# Patient Record
Sex: MALE | Race: WHITE | NOT HISPANIC OR LATINO | Employment: OTHER | URBAN - METROPOLITAN AREA
[De-identification: names, ages, dates, MRNs, and addresses within clinical notes are randomized per-mention and may not be internally consistent; named-entity substitution may affect disease eponyms.]

---

## 2018-12-13 ENCOUNTER — HOSPITAL ENCOUNTER (EMERGENCY)
Facility: MEDICAL CENTER | Age: 68
End: 2018-12-13
Attending: EMERGENCY MEDICINE

## 2018-12-13 VITALS
DIASTOLIC BLOOD PRESSURE: 93 MMHG | OXYGEN SATURATION: 96 % | RESPIRATION RATE: 18 BRPM | SYSTOLIC BLOOD PRESSURE: 135 MMHG | HEIGHT: 74 IN | TEMPERATURE: 97.5 F | BODY MASS INDEX: 18.76 KG/M2 | HEART RATE: 72 BPM | WEIGHT: 146.16 LBS

## 2018-12-13 DIAGNOSIS — R33.9 URINARY RETENTION: ICD-10-CM

## 2018-12-13 LAB
APPEARANCE UR: CLEAR
BILIRUB UR QL STRIP.AUTO: NEGATIVE
COLOR UR: YELLOW
GLUCOSE UR STRIP.AUTO-MCNC: NEGATIVE MG/DL
KETONES UR STRIP.AUTO-MCNC: NEGATIVE MG/DL
LEUKOCYTE ESTERASE UR QL STRIP.AUTO: NEGATIVE
MICRO URNS: NORMAL
NITRITE UR QL STRIP.AUTO: NEGATIVE
PH UR STRIP.AUTO: 5 [PH]
PROT UR QL STRIP: NEGATIVE MG/DL
RBC UR QL AUTO: NEGATIVE
SP GR UR STRIP.AUTO: 1.01
UROBILINOGEN UR STRIP.AUTO-MCNC: 0.2 MG/DL

## 2018-12-13 PROCEDURE — 99284 EMERGENCY DEPT VISIT MOD MDM: CPT

## 2018-12-13 PROCEDURE — 81003 URINALYSIS AUTO W/O SCOPE: CPT

## 2018-12-13 PROCEDURE — 51701 INSERT BLADDER CATHETER: CPT

## 2018-12-13 ASSESSMENT — PAIN SCALES - GENERAL: PAINLEVEL_OUTOF10: 10

## 2018-12-13 NOTE — ED TRIAGE NOTES
Mame Lester  68 y.o.  male  Chief Complaint   Patient presents with   • Unable to Urinate     Present to triage c/o unable to urinate x 2-3 hrs. Prefer to stand due to pain.

## 2018-12-13 NOTE — ED PROVIDER NOTES
"ER Provider Note     Scribed for Valentín Lyle M.D. by Luba Goins. 12/13/2018, 3:02 AM.    Primary Care Provider: None noted  Means of Arrival: Walk in   History obtained from: Patient  History limited by: None     CHIEF COMPLAINT  Chief Complaint   Patient presents with   • Unable to Urinate       HPI  Mame Lester is a 68 y.o. male with history of enlarged prostate who presents to the Emergency Department for evaluation of urinary retention with associated lower abdominal pain. He states he was last able to urinate normally at 1 AM tonight. The patient reports prior to urinary retention he consumed wine [place] a local casino, and states he has had prior episodes of urinary retention following alcohol consumption. He denies associated dysuria. The patient also reports an approximate 35lb weight loss over the past 6 months with no change in diet or purposeful weight loss. No alleviating or exacerbating factors are identified at this time.     REVIEW OF SYSTEMS  See HPI for further details.    PAST MEDICAL HISTORY   Urinary Retention  Enlarged Prostate    SURGICAL HISTORY  patient denies any surgical history    SOCIAL HISTORY  Social History   Substance Use Topics   • Smoking status: Never Smoker   • Smokeless tobacco: Never Used   • Alcohol use Yes      Comment: occ      History   Drug Use No       FAMILY HISTORY  History reviewed. No pertinent family history.    CURRENT MEDICATIONS  Home Medications     Reviewed by Otis Zhu R.N. (Registered Nurse) on 12/13/18 at 0240  Med List Status: Complete   Medication Last Dose Status        Patient Jovanni Taking any Medications                       ALLERGIES  No Known Allergies    PHYSICAL EXAM  VITAL SIGNS: /93   Pulse 86   Temp 36.4 °C (97.5 °F) (Temporal)   Resp 14   Ht 1.88 m (6' 2\")   Wt 66.3 kg (146 lb 2.6 oz)   SpO2 97%   BMI 18.77 kg/m²       Constitutional: Alert in no apparent distress.  HENT: Normocephalic, Atraumatic, Bilateral external " ears normal. Nose normal.   Eyes: Pupils are equal and reactive. Conjunctiva normal, non-icteric.   Heart: Regular rate and rythm, no murmurs.    Lungs: Clear to auscultation bilaterally.  Abdomen: Suprapubic tenderness and fullness.  Skin: Warm, Dry, No erythema, No rash.   Neurologic: Alert, Grossly non-focal.   Psychiatric: Affect normal, Judgment normal, Mood normal, Appears appropriate and not intoxicated.     DIAGNOSTIC STUDIES / PROCEDURES    LABS  Labs Reviewed   URINALYSIS,CULTURE IF INDICATED     All labs reviewed by me.    COURSE & MEDICAL DECISION MAKING  Pertinent Labs & Imaging studies reviewed. (See chart for details)    This is a 68 y.o. male that presents with inability to urinate.  The patient has a history of this.  The patient is unsure that he has any prostate issues.  At this time I will get a urinalysis and the patient is requesting that I did not place a Spencer and wants just a straight cath.     3:02 AM - Patient seen and examined at bedside. Ordered Urinalysis. Bedside ultrasound shows distended bladder at this time. The patient was informed he will require a catheter, but does not wish to have spencer catheter placed. He is understanding of risks associated like being unable void bladder if he does not have spencer catheter placed. He will be straight cathed to retrieve urine sample and consents.     3:47 AM - Patient reevaluated at bedside. He reports to be feeling better after straight catheter. Discussed with the patient the possibility of cancer given recent weight loss and acuity of symptoms. I stressed the importance of follow up with Urology. He is understanding and agreeable to discharge.     The patient was found to have no infection in his urine.  The patient was able to void after I straight cathed him.     The patient will return for new or worsening symptoms and is stable at the time of discharge.    The patient is referred to a primary physician for blood pressure management,  diabetic screening, and for all other preventative health concerns.    DISPOSITION:  Patient will be discharged home in stable condition.    FOLLOW UP:  Blake Flores M.D.  [address]  [place] [id number]  [phone]    In 1 week        OUTPATIENT MEDICATIONS:  There are no discharge medications for this patient.      FINAL IMPRESSION  1. Urinary retention          Luba HUNT (Scribellie), am scribing for, and in the presence of, Valentín Lyle M.D..    Electronically signed by: Luba Goins (Daniela), 12/13/2018    Valentín HUNT M.D. personally performed the services described in this documentation, as scribed by Luba Goins in my presence, and it is both accurate and complete.     E.    The note accurately reflects work and decisions made by me.  Valentín Lyle  12/13/2018  6:00 AM

## 2018-12-13 NOTE — PROGRESS NOTES
Discharging patient home. Verbalized understanding of discharge instructions, follow up appointments, and home care. All questions answered and all personal belongings with patient at time of discharge. Vital signs WNL. Patient given discharge information papers and discharge assessment completed.     Pt ambulated to lobby with steady gait.

## 2018-12-13 NOTE — ED NOTES
Pt straight cathed for urine retention, significant resistance met during cath. No trauma to area  600mL out sent to lab   Pt states he was 170lbs 6 months ago, states no purposeful weight loss, no changes in diet. Pt is now 135 per pt.   ERP notified.

## 2019-01-15 ENCOUNTER — HOSPITAL ENCOUNTER (EMERGENCY)
Dept: HOSPITAL 8 - ED | Age: 69
Discharge: HOME | End: 2019-01-15
Payer: SELF-PAY

## 2019-01-15 VITALS — SYSTOLIC BLOOD PRESSURE: 104 MMHG | DIASTOLIC BLOOD PRESSURE: 63 MMHG

## 2019-01-15 VITALS — BODY MASS INDEX: 18.53 KG/M2 | HEIGHT: 74 IN | WEIGHT: 144.4 LBS

## 2019-01-15 DIAGNOSIS — R33.8: ICD-10-CM

## 2019-01-15 DIAGNOSIS — N40.1: Primary | ICD-10-CM

## 2019-01-15 LAB
ALBUMIN SERPL-MCNC: 3.8 G/DL (ref 3.4–5)
ANION GAP SERPL CALC-SCNC: 5 MMOL/L (ref 5–15)
BASOPHILS # BLD AUTO: 0.03 X10^3/UL (ref 0–0.1)
BASOPHILS NFR BLD AUTO: 1 % (ref 0–1)
CALCIUM SERPL-MCNC: 8.7 MG/DL (ref 8.5–10.1)
CHLORIDE SERPL-SCNC: 107 MMOL/L (ref 98–107)
CREAT SERPL-MCNC: 0.88 MG/DL (ref 0.7–1.3)
CULTURE INDICATED?: NO
EOSINOPHIL # BLD AUTO: 0.41 X10^3/UL (ref 0–0.4)
EOSINOPHIL NFR BLD AUTO: 7 % (ref 1–7)
ERYTHROCYTE [DISTWIDTH] IN BLOOD BY AUTOMATED COUNT: 12.8 % (ref 9.4–14.8)
LYMPHOCYTES # BLD AUTO: 1.39 X10^3/UL (ref 1–3.4)
LYMPHOCYTES NFR BLD AUTO: 23 % (ref 22–44)
MCH RBC QN AUTO: 32.6 PG (ref 27.5–34.5)
MCHC RBC AUTO-ENTMCNC: 34.2 G/DL (ref 33.2–36.2)
MCV RBC AUTO: 95.3 FL (ref 81–97)
MD: NO
MICROSCOPIC: (no result)
MONOCYTES # BLD AUTO: 0.54 X10^3/UL (ref 0.2–0.8)
MONOCYTES NFR BLD AUTO: 9 % (ref 2–9)
NEUTROPHILS # BLD AUTO: 3.57 X10^3/UL (ref 1.8–6.8)
NEUTROPHILS NFR BLD AUTO: 60 % (ref 42–75)
PLATELET # BLD AUTO: 243 X10^3/UL (ref 130–400)
PMV BLD AUTO: 8.6 FL (ref 7.4–10.4)
RBC # BLD AUTO: 4.62 X10^6/UL (ref 4.38–5.82)

## 2019-01-15 PROCEDURE — 82040 ASSAY OF SERUM ALBUMIN: CPT

## 2019-01-15 PROCEDURE — 99283 EMERGENCY DEPT VISIT LOW MDM: CPT

## 2019-01-15 PROCEDURE — 81001 URINALYSIS AUTO W/SCOPE: CPT

## 2019-01-15 PROCEDURE — 85025 COMPLETE CBC W/AUTO DIFF WBC: CPT

## 2019-01-15 PROCEDURE — 80048 BASIC METABOLIC PNL TOTAL CA: CPT

## 2019-01-15 PROCEDURE — 36415 COLL VENOUS BLD VENIPUNCTURE: CPT

## 2019-01-15 NOTE — NUR
Pt ambulates to restroom with steady gait and balane. Pt ambulates back to 
room. Pt able to urinate on his own at this time.

## 2019-01-15 NOTE — NUR
PT TO ROOM WITH INTENSE FEELING THAT HE IS RETAINING URINE, PT REQUSETED IN AND 
OUT CATH, CATHED  ML

## 2019-01-15 NOTE — NUR
Patient given discharge instructions and they have confirmed that they 
understand the instructions.  Patient ambulatory with steady gait. Pt left with 
all personal belongings, discharge paperwork, and prescription.

## 2019-01-15 NOTE — NUR
Recieved report from BALBINA Nicolas. All questions answered. Assuming care of this 
pt at this time. First contact with pt. Pt laying on gurney connected to NIBP 
and continous pulse ox. NADN. Pt denies pain at this time. Pt states, "I had 
urinary retention about two months ago, it seems to happen when I drink. I am 
feeling better now since she straight cathed me." Pt has call light within 
reach. All safety measures in place. No needs expressed at this time.

## 2019-02-14 ENCOUNTER — HOSPITAL ENCOUNTER (EMERGENCY)
Dept: HOSPITAL 8 - ED | Age: 69
Discharge: LEFT BEFORE DISCHARGE | End: 2019-02-14
Payer: COMMERCIAL

## 2019-02-14 VITALS — HEIGHT: 73 IN | WEIGHT: 138.89 LBS | BODY MASS INDEX: 18.41 KG/M2

## 2019-02-14 VITALS — DIASTOLIC BLOOD PRESSURE: 93 MMHG | SYSTOLIC BLOOD PRESSURE: 138 MMHG

## 2019-02-14 DIAGNOSIS — N40.1: Primary | ICD-10-CM

## 2019-02-14 DIAGNOSIS — R33.8: ICD-10-CM

## 2019-02-14 PROCEDURE — 51701 INSERT BLADDER CATHETER: CPT

## 2019-02-14 PROCEDURE — 99284 EMERGENCY DEPT VISIT MOD MDM: CPT

## 2019-02-14 PROCEDURE — 99283 EMERGENCY DEPT VISIT LOW MDM: CPT

## 2019-02-14 PROCEDURE — 99281 EMR DPT VST MAYX REQ PHY/QHP: CPT

## 2019-02-14 PROCEDURE — P9612 CATHETERIZE FOR URINE SPEC: HCPCS

## 2019-02-14 NOTE — NUR
PT STATES HE WOULD ONLY LIKE TO GET A STRAIGHT CATH AND NO FURTHER TESTING. 
EDUCATED BY PROVIDER AND THIS RN. PT STILL REFUSING. PT SIGNED AMA AND PLACED 
ON CHART.

## 2019-04-16 ENCOUNTER — HOSPITAL ENCOUNTER (EMERGENCY)
Facility: MEDICAL CENTER | Age: 69
End: 2019-04-16
Attending: EMERGENCY MEDICINE

## 2019-04-16 ENCOUNTER — PATIENT OUTREACH (OUTPATIENT)
Dept: HEALTH INFORMATION MANAGEMENT | Facility: OTHER | Age: 69
End: 2019-04-16

## 2019-04-16 VITALS
HEIGHT: 74 IN | OXYGEN SATURATION: 97 % | SYSTOLIC BLOOD PRESSURE: 114 MMHG | WEIGHT: 140.43 LBS | HEART RATE: 100 BPM | TEMPERATURE: 98.1 F | DIASTOLIC BLOOD PRESSURE: 75 MMHG | BODY MASS INDEX: 18.02 KG/M2 | RESPIRATION RATE: 16 BRPM

## 2019-04-16 DIAGNOSIS — R33.9 URINARY RETENTION: ICD-10-CM

## 2019-04-16 LAB
APPEARANCE UR: CLEAR
BACTERIA #/AREA URNS HPF: NEGATIVE /HPF
BILIRUB UR QL STRIP.AUTO: NEGATIVE
COLOR UR: YELLOW
EPI CELLS #/AREA URNS HPF: NEGATIVE /HPF
GLUCOSE UR STRIP.AUTO-MCNC: NEGATIVE MG/DL
HYALINE CASTS #/AREA URNS LPF: ABNORMAL /LPF
KETONES UR STRIP.AUTO-MCNC: NEGATIVE MG/DL
LEUKOCYTE ESTERASE UR QL STRIP.AUTO: NEGATIVE
MICRO URNS: ABNORMAL
NITRITE UR QL STRIP.AUTO: NEGATIVE
PH UR STRIP.AUTO: 5 [PH]
PROT UR QL STRIP: NEGATIVE MG/DL
RBC # URNS HPF: ABNORMAL /HPF
RBC UR QL AUTO: ABNORMAL
SP GR UR STRIP.AUTO: 1.01
UROBILINOGEN UR STRIP.AUTO-MCNC: 0.2 MG/DL
WBC #/AREA URNS HPF: ABNORMAL /HPF

## 2019-04-16 PROCEDURE — 99284 EMERGENCY DEPT VISIT MOD MDM: CPT

## 2019-04-16 PROCEDURE — 51702 INSERT TEMP BLADDER CATH: CPT

## 2019-04-16 PROCEDURE — 81001 URINALYSIS AUTO W/SCOPE: CPT

## 2019-04-16 PROCEDURE — 303105 HCHG CATHETER EXTRA

## 2019-04-16 NOTE — ED NOTES
I go to pt room to provide discharge instructions and pt has left.  Unable to provide instructions  Unable to take DC Vital signs.

## 2019-04-16 NOTE — ED NOTES
#16 fr spencer catheter placed without difficulty.  Immediate return of 700 ml of clear yellow urine.

## 2019-04-16 NOTE — ED NOTES
"To yellow 62.  Hasn't voided since early am.  States \"I can't drink the casino wine, every time I do this happens\"  Does not happen with other jason.    "

## 2019-04-16 NOTE — ED TRIAGE NOTES
Pt comes in complaining of urinary retention since early this morning. Pt stating hx of the same and he needed a catheter. Pt appears uncomfortable.

## 2019-07-14 ENCOUNTER — HOSPITAL ENCOUNTER (EMERGENCY)
Dept: HOSPITAL 8 - ED | Age: 69
Discharge: HOME | End: 2019-07-14
Payer: SELF-PAY

## 2019-07-14 VITALS — BODY MASS INDEX: 18.9 KG/M2 | HEIGHT: 74 IN | WEIGHT: 147.27 LBS

## 2019-07-14 VITALS — DIASTOLIC BLOOD PRESSURE: 67 MMHG | SYSTOLIC BLOOD PRESSURE: 105 MMHG

## 2019-07-14 DIAGNOSIS — R33.8: ICD-10-CM

## 2019-07-14 DIAGNOSIS — N40.1: Primary | ICD-10-CM

## 2019-07-14 LAB
CULTURE INDICATED?: NO
MICROSCOPIC: (no result)

## 2019-07-14 PROCEDURE — 81001 URINALYSIS AUTO W/SCOPE: CPT

## 2019-07-14 PROCEDURE — 99284 EMERGENCY DEPT VISIT MOD MDM: CPT

## 2019-07-14 NOTE — NUR
PT RESTING IN BED. NO NEEDS EXPRESSED. CALL LIGHT WITHIN REACH. PT REPORTS 
RELIEF AND LESS BLADDER PAIN. AWAITING FURTHER ORDERS.

## 2019-07-14 NOTE — NUR
straight cath completed [facility] sterile technique per pt request. approx 850mL 
drained from bladder. ua collected and sent. vss. pt tolerated well. awaiting 
resutls.

## 2019-07-14 NOTE — NUR
PT TO ED FOR URINARY RETENTION STARTING LAST NIGHT AFTER DRINKING ALCOHOL. PT 
CONNECTED TO MONITORS. VSS. EDMD PRESENT FOR ASSESSMENT WITH US. PLAN TO 
CATHETERIZE AND ADMIN FLOMAX. WILL SEND UA AFTER CATHETER. AWAITING ORDERS.

## 2020-02-27 ENCOUNTER — HOSPITAL ENCOUNTER (EMERGENCY)
Dept: HOSPITAL 8 - ED | Age: 70
Discharge: HOME | End: 2020-02-27
Payer: SELF-PAY

## 2020-02-27 VITALS — HEIGHT: 74 IN | BODY MASS INDEX: 18.87 KG/M2 | WEIGHT: 147.05 LBS

## 2020-02-27 VITALS — SYSTOLIC BLOOD PRESSURE: 125 MMHG | DIASTOLIC BLOOD PRESSURE: 81 MMHG

## 2020-02-27 DIAGNOSIS — R33.8: ICD-10-CM

## 2020-02-27 DIAGNOSIS — N40.1: Primary | ICD-10-CM

## 2020-02-27 LAB
ALBUMIN SERPL-MCNC: 3.5 G/DL (ref 3.4–5)
ANION GAP SERPL CALC-SCNC: 10 MMOL/L (ref 5–15)
BASOPHILS # BLD AUTO: 0.04 X10^3/UL (ref 0–0.1)
BASOPHILS NFR BLD AUTO: 1 % (ref 0–1)
CALCIUM SERPL-MCNC: 8.1 MG/DL (ref 8.5–10.1)
CHLORIDE SERPL-SCNC: 105 MMOL/L (ref 98–107)
CREAT SERPL-MCNC: 0.89 MG/DL (ref 0.7–1.3)
CULTURE INDICATED?: NO
EOSINOPHIL # BLD AUTO: 0.03 X10^3/UL (ref 0–0.4)
EOSINOPHIL NFR BLD AUTO: 0 % (ref 1–7)
ERYTHROCYTE [DISTWIDTH] IN BLOOD BY AUTOMATED COUNT: 15 % (ref 9.4–14.8)
LYMPHOCYTES # BLD AUTO: 0.79 X10^3/UL (ref 1–3.4)
LYMPHOCYTES NFR BLD AUTO: 11 % (ref 22–44)
MCH RBC QN AUTO: 31.1 PG (ref 27.5–34.5)
MCHC RBC AUTO-ENTMCNC: 33.5 G/DL (ref 33.2–36.2)
MCV RBC AUTO: 92.7 FL (ref 81–97)
MD: NO
MICROSCOPIC: (no result)
MONOCYTES # BLD AUTO: 0.28 X10^3/UL (ref 0.2–0.8)
MONOCYTES NFR BLD AUTO: 4 % (ref 2–9)
NEUTROPHILS # BLD AUTO: 5.82 X10^3/UL (ref 1.8–6.8)
NEUTROPHILS NFR BLD AUTO: 84 % (ref 42–75)
PLATELET # BLD AUTO: 291 X10^3/UL (ref 130–400)
PMV BLD AUTO: 8.6 FL (ref 7.4–10.4)
RBC # BLD AUTO: 4.69 X10^6/UL (ref 4.38–5.82)

## 2020-02-27 PROCEDURE — 82040 ASSAY OF SERUM ALBUMIN: CPT

## 2020-02-27 PROCEDURE — 99283 EMERGENCY DEPT VISIT LOW MDM: CPT

## 2020-02-27 PROCEDURE — 81003 URINALYSIS AUTO W/O SCOPE: CPT

## 2020-02-27 PROCEDURE — 80048 BASIC METABOLIC PNL TOTAL CA: CPT

## 2020-02-27 PROCEDURE — 85025 COMPLETE CBC W/AUTO DIFF WBC: CPT

## 2020-02-27 PROCEDURE — 36415 COLL VENOUS BLD VENIPUNCTURE: CPT

## 2020-02-27 NOTE — NUR
INSERTED HINOJOSA CATHETER, STERILE TECHNIQUE OBSERVED BY SECONDARY RN. 



ERMD IN TO EVAL PT, PT DOES NOT WISH AT THIS TIME TO KEEP HINOJOSA CATHETER 
DESPITE URINARY RETENTION. WILL RE-EVAL

## 2020-02-27 NOTE — NUR
MULTIPLE ATTEMPTS TO EDUCATE PT ON IMPORTANCE OF KEEPING HINOJOSA ON DC D/T 
RETENTION, PT INSISTS ON HAVING IT REMOVED, PT STATES "I DONT HAVE FUNDS" PT 
BELIEVES HE RETAINS URINE ONLY WHEN DRINKING CASINO BEER, PT STATES HE WILL NOT 
DO THIS ANYMORE.

## 2020-06-28 ENCOUNTER — HOSPITAL ENCOUNTER (EMERGENCY)
Dept: HOSPITAL 8 - ED | Age: 70
LOS: 1 days | Discharge: HOME | End: 2020-06-29
Payer: COMMERCIAL

## 2020-06-28 VITALS — WEIGHT: 150.36 LBS | HEIGHT: 74 IN | BODY MASS INDEX: 19.3 KG/M2

## 2020-06-28 VITALS — SYSTOLIC BLOOD PRESSURE: 104 MMHG | DIASTOLIC BLOOD PRESSURE: 66 MMHG

## 2020-06-28 DIAGNOSIS — R33.8: Primary | ICD-10-CM

## 2020-06-28 LAB — MICROSCOPIC: (no result)

## 2020-06-28 PROCEDURE — 99284 EMERGENCY DEPT VISIT MOD MDM: CPT

## 2020-06-28 PROCEDURE — 81001 URINALYSIS AUTO W/SCOPE: CPT

## 2020-06-28 PROCEDURE — 51702 INSERT TEMP BLADDER CATH: CPT

## 2020-06-29 NOTE — NUR
HINOJOSA REMOVED PER PT REQUEST, PT REFUSES TO KEEP HINOJOSA OR FOLLOW UP REFERRAL, 
PT STATES HE WILL TAKE CARE OF HIMSELFT AT HOME AND NOT DRINK ALCOHOL AT THE 
[place], PT REPORTS HE BELIEVES DRINKING ALCOHOL AT THE [place] CAUSED HIS 
URINARY RETENTION.

## 2021-03-03 DIAGNOSIS — Z23 NEED FOR VACCINATION: ICD-10-CM

## 2021-03-18 ENCOUNTER — HOSPITAL ENCOUNTER (EMERGENCY)
Dept: HOSPITAL 8 - ED | Age: 71
Discharge: HOME | End: 2021-03-18
Payer: SELF-PAY

## 2021-03-18 VITALS — HEIGHT: 74 IN | WEIGHT: 148.37 LBS | BODY MASS INDEX: 19.04 KG/M2

## 2021-03-18 VITALS — DIASTOLIC BLOOD PRESSURE: 50 MMHG | SYSTOLIC BLOOD PRESSURE: 99 MMHG

## 2021-03-18 DIAGNOSIS — R31.0: ICD-10-CM

## 2021-03-18 DIAGNOSIS — R33.8: ICD-10-CM

## 2021-03-18 DIAGNOSIS — N40.1: Primary | ICD-10-CM

## 2021-03-18 LAB
ALBUMIN SERPL-MCNC: 3.6 G/DL (ref 3.4–5)
ALP SERPL-CCNC: 79 U/L (ref 45–117)
ALT SERPL-CCNC: 15 U/L (ref 12–78)
ANION GAP SERPL CALC-SCNC: 9 MMOL/L (ref 5–15)
BASOPHILS # BLD AUTO: 0.1 X10^3/UL (ref 0–0.1)
BASOPHILS NFR BLD AUTO: 1 % (ref 0–1)
BILIRUB SERPL-MCNC: 0.6 MG/DL (ref 0.2–1)
CALCIUM SERPL-MCNC: 8.4 MG/DL (ref 8.5–10.1)
CHLORIDE SERPL-SCNC: 104 MMOL/L (ref 98–107)
CREAT SERPL-MCNC: 1.1 MG/DL (ref 0.7–1.3)
EOSINOPHIL # BLD AUTO: 0 X10^3/UL (ref 0–0.4)
EOSINOPHIL NFR BLD AUTO: 0 % (ref 1–7)
ERYTHROCYTE [DISTWIDTH] IN BLOOD BY AUTOMATED COUNT: 13.2 % (ref 9.4–14.8)
LYMPHOCYTES # BLD AUTO: 0.9 X10^3/UL (ref 1–3.4)
LYMPHOCYTES NFR BLD AUTO: 10 % (ref 22–44)
MCH RBC QN AUTO: 32.9 PG (ref 27.5–34.5)
MCHC RBC AUTO-ENTMCNC: 34.5 G/DL (ref 33.2–36.2)
MD: NO
MICROSCOPIC: (no result)
MONOCYTES # BLD AUTO: 0.5 X10^3/UL (ref 0.2–0.8)
MONOCYTES NFR BLD AUTO: 6 % (ref 2–9)
NEUTROPHILS # BLD AUTO: 7.2 X10^3/UL (ref 1.8–6.8)
NEUTROPHILS NFR BLD AUTO: 83 % (ref 42–75)
PLATELET # BLD AUTO: 262 X10^3/UL (ref 130–400)
PMV BLD AUTO: 9.1 FL (ref 7.4–10.4)
PROT SERPL-MCNC: 6.9 G/DL (ref 6.4–8.2)
RBC # BLD AUTO: 4.47 X10^6/UL (ref 4.38–5.82)

## 2021-03-18 PROCEDURE — 80053 COMPREHEN METABOLIC PANEL: CPT

## 2021-03-18 PROCEDURE — 51702 INSERT TEMP BLADDER CATH: CPT

## 2021-03-18 PROCEDURE — 81001 URINALYSIS AUTO W/SCOPE: CPT

## 2021-03-18 PROCEDURE — 36415 COLL VENOUS BLD VENIPUNCTURE: CPT

## 2021-03-18 PROCEDURE — 85025 COMPLETE CBC W/AUTO DIFF WBC: CPT

## 2021-03-18 PROCEDURE — 99284 EMERGENCY DEPT VISIT MOD MDM: CPT

## 2021-03-18 NOTE — NUR
BLADDER SCAN . PER PROVIDER STRAIGHT CATH TO DRAIN. PT TOLERATED 
PROCEDURE WELL.  UA WILL BE SENT TO RULE OUT UTI.

## 2021-03-18 NOTE — NUR
Unable to void since midnight (9.5 hours). Hx of same that resolved with 
straight cath and rx

bladder scan of 77. Patient adamant on not keeping garcia in place. erp 
agreeable

## 2021-06-22 ENCOUNTER — HOSPITAL ENCOUNTER (EMERGENCY)
Dept: HOSPITAL 8 - ED | Age: 71
Discharge: HOME | End: 2021-06-22
Payer: MEDICARE

## 2021-06-22 VITALS — BODY MASS INDEX: 19.1 KG/M2 | WEIGHT: 148.81 LBS | HEIGHT: 74 IN

## 2021-06-22 VITALS — SYSTOLIC BLOOD PRESSURE: 108 MMHG | DIASTOLIC BLOOD PRESSURE: 59 MMHG

## 2021-06-22 DIAGNOSIS — R33.8: ICD-10-CM

## 2021-06-22 DIAGNOSIS — N40.1: Primary | ICD-10-CM

## 2021-06-22 LAB — MICROSCOPIC: (no result)

## 2021-06-22 PROCEDURE — 99284 EMERGENCY DEPT VISIT MOD MDM: CPT

## 2021-06-22 PROCEDURE — 51702 INSERT TEMP BLADDER CATH: CPT

## 2021-06-22 PROCEDURE — 81001 URINALYSIS AUTO W/SCOPE: CPT

## 2021-06-22 NOTE — NUR
HINOJOSA REMOVED NO ISSUES. Patient/Caregiver given discharge instructions and 
they have confirmed that they understand the instructions.  Patient ambulatory 
with steady gait.

## 2021-06-22 NOTE — NUR
pt c/o of not being able to urinate since midnight last night.

states it always happens when he drinks. reports drinking 3 glasses of wine and 
2 beers.



pt attached to monitors. vss. nadn. breathing even and unlabored. bladder 
distention noted. 

pt is refusing a garcia catheter and requesting a straight catheter that stays 
out.

MD recommends pt to seeing a urologist but pt appears reluctant.



bed in low position, rails engaged, call light on lap.

wctm

## 2021-06-22 NOTE — NUR
INSERTED HINOJOSA CATHTER PER HOSPITAL POLICY. PT URINE CLEAR AND YELLOW. OUTPUT 
1050ML

A LOT OF RESISTANCE FROM POSSIBLE ENLARGED PROSTATE. PT REFUSING TO KEEP HINOJOSA 
IN AFTER DC. 

BEDSIDE REPORT GIVEN TO LOPEZ. TRANSFER OF CARE. UA SENT TO LAB

## 2022-10-17 ENCOUNTER — APPOINTMENT (OUTPATIENT)
Dept: RADIOLOGY | Facility: MEDICAL CENTER | Age: 72
DRG: 378 | End: 2022-10-17
Attending: EMERGENCY MEDICINE
Payer: MEDICARE

## 2022-10-17 ENCOUNTER — HOSPITAL ENCOUNTER (INPATIENT)
Facility: MEDICAL CENTER | Age: 72
LOS: 1 days | DRG: 378 | End: 2022-10-18
Attending: EMERGENCY MEDICINE | Admitting: STUDENT IN AN ORGANIZED HEALTH CARE EDUCATION/TRAINING PROGRAM
Payer: MEDICARE

## 2022-10-17 DIAGNOSIS — K92.2 GASTROINTESTINAL HEMORRHAGE, UNSPECIFIED GASTROINTESTINAL HEMORRHAGE TYPE: ICD-10-CM

## 2022-10-17 DIAGNOSIS — R07.9 ACUTE CHEST PAIN: ICD-10-CM

## 2022-10-17 DIAGNOSIS — F10.10 ETOH ABUSE: ICD-10-CM

## 2022-10-17 DIAGNOSIS — D62 ACUTE BLOOD LOSS ANEMIA: ICD-10-CM

## 2022-10-17 PROBLEM — E46 PROTEIN CALORIE MALNUTRITION (HCC): Status: ACTIVE | Noted: 2022-10-17

## 2022-10-17 LAB
ABO + RH BLD: NORMAL
ABO GROUP BLD: NORMAL
ALBUMIN SERPL BCP-MCNC: 3.7 G/DL (ref 3.2–4.9)
ALBUMIN/GLOB SERPL: 1.2 G/DL
ALP SERPL-CCNC: 80 U/L (ref 30–99)
ALT SERPL-CCNC: 10 U/L (ref 2–50)
ANION GAP SERPL CALC-SCNC: 13 MMOL/L (ref 7–16)
APTT PPP: 30.3 SEC (ref 24.7–36)
AST SERPL-CCNC: 20 U/L (ref 12–45)
BASOPHILS # BLD AUTO: 0.7 % (ref 0–1.8)
BASOPHILS # BLD: 0.05 K/UL (ref 0–0.12)
BILIRUB SERPL-MCNC: 0.3 MG/DL (ref 0.1–1.5)
BLD GP AB SCN SERPL QL: NORMAL
BUN SERPL-MCNC: 18 MG/DL (ref 8–22)
CALCIUM SERPL-MCNC: 8.8 MG/DL (ref 8.5–10.5)
CFT BLD TEG: 4.9 MIN (ref 4.6–9.1)
CFT P HPASE BLD TEG: 4.3 MIN (ref 4.3–8.3)
CHLORIDE SERPL-SCNC: 100 MMOL/L (ref 96–112)
CLOT ANGLE BLD TEG: 79.7 DEGREES (ref 63–78)
CLOT LYSIS 30M P MA LENFR BLD TEG: 0.4 % (ref 0–2.6)
CO2 SERPL-SCNC: 26 MMOL/L (ref 20–33)
CREAT SERPL-MCNC: 0.72 MG/DL (ref 0.5–1.4)
CT.EXTRINSIC BLD ROTEM: 0.8 MIN (ref 0.8–2.1)
EKG IMPRESSION: NORMAL
EOSINOPHIL # BLD AUTO: 0.04 K/UL (ref 0–0.51)
EOSINOPHIL NFR BLD: 0.6 % (ref 0–6.9)
ERYTHROCYTE [DISTWIDTH] IN BLOOD BY AUTOMATED COUNT: 45.1 FL (ref 35.9–50)
GFR SERPLBLD CREATININE-BSD FMLA CKD-EPI: 98 ML/MIN/1.73 M 2
GLOBULIN SER CALC-MCNC: 3.2 G/DL (ref 1.9–3.5)
GLUCOSE SERPL-MCNC: 88 MG/DL (ref 65–99)
HCT VFR BLD AUTO: 35.1 % (ref 42–52)
HGB BLD-MCNC: 10.7 G/DL (ref 14–18)
HGB BLD-MCNC: 11.6 G/DL (ref 14–18)
IMM GRANULOCYTES # BLD AUTO: 0.02 K/UL (ref 0–0.11)
IMM GRANULOCYTES NFR BLD AUTO: 0.3 % (ref 0–0.9)
INR PPP: 1.23 (ref 0.87–1.13)
LIPASE SERPL-CCNC: 27 U/L (ref 11–82)
LYMPHOCYTES # BLD AUTO: 1.43 K/UL (ref 1–4.8)
LYMPHOCYTES NFR BLD: 20.9 % (ref 22–41)
MCF BLD TEG: 68.8 MM (ref 52–69)
MCF.PLATELET INHIB BLD ROTEM: 36 MM (ref 15–32)
MCH RBC QN AUTO: 31 PG (ref 27–33)
MCHC RBC AUTO-ENTMCNC: 33 G/DL (ref 33.7–35.3)
MCV RBC AUTO: 93.9 FL (ref 81.4–97.8)
MONOCYTES # BLD AUTO: 0.54 K/UL (ref 0–0.85)
MONOCYTES NFR BLD AUTO: 7.9 % (ref 0–13.4)
NEUTROPHILS # BLD AUTO: 4.77 K/UL (ref 1.82–7.42)
NEUTROPHILS NFR BLD: 69.6 % (ref 44–72)
NRBC # BLD AUTO: 0 K/UL
NRBC BLD-RTO: 0 /100 WBC
PLATELET # BLD AUTO: 344 K/UL (ref 164–446)
PMV BLD AUTO: 9.6 FL (ref 9–12.9)
POTASSIUM SERPL-SCNC: 3.8 MMOL/L (ref 3.6–5.5)
PROT SERPL-MCNC: 6.9 G/DL (ref 6–8.2)
PROTHROMBIN TIME: 15.3 SEC (ref 12–14.6)
RBC # BLD AUTO: 3.74 M/UL (ref 4.7–6.1)
RH BLD: NORMAL
SODIUM SERPL-SCNC: 139 MMOL/L (ref 135–145)
TEG ALGORITHM TGALG: ABNORMAL
TROPONIN T SERPL-MCNC: 13 NG/L (ref 6–19)
WBC # BLD AUTO: 6.9 K/UL (ref 4.8–10.8)

## 2022-10-17 PROCEDURE — 700111 HCHG RX REV CODE 636 W/ 250 OVERRIDE (IP): Mod: JA | Performed by: STUDENT IN AN ORGANIZED HEALTH CARE EDUCATION/TRAINING PROGRAM

## 2022-10-17 PROCEDURE — 85576 BLOOD PLATELET AGGREGATION: CPT

## 2022-10-17 PROCEDURE — 80053 COMPREHEN METABOLIC PANEL: CPT

## 2022-10-17 PROCEDURE — 99285 EMERGENCY DEPT VISIT HI MDM: CPT

## 2022-10-17 PROCEDURE — 96375 TX/PRO/DX INJ NEW DRUG ADDON: CPT

## 2022-10-17 PROCEDURE — 96367 TX/PROPH/DG ADDL SEQ IV INF: CPT

## 2022-10-17 PROCEDURE — 85018 HEMOGLOBIN: CPT

## 2022-10-17 PROCEDURE — 99223 1ST HOSP IP/OBS HIGH 75: CPT | Performed by: STUDENT IN AN ORGANIZED HEALTH CARE EDUCATION/TRAINING PROGRAM

## 2022-10-17 PROCEDURE — 85025 COMPLETE CBC W/AUTO DIFF WBC: CPT

## 2022-10-17 PROCEDURE — 85347 COAGULATION TIME ACTIVATED: CPT

## 2022-10-17 PROCEDURE — 700105 HCHG RX REV CODE 258: Performed by: EMERGENCY MEDICINE

## 2022-10-17 PROCEDURE — 93005 ELECTROCARDIOGRAM TRACING: CPT

## 2022-10-17 PROCEDURE — 85610 PROTHROMBIN TIME: CPT

## 2022-10-17 PROCEDURE — 86901 BLOOD TYPING SEROLOGIC RH(D): CPT

## 2022-10-17 PROCEDURE — 96365 THER/PROPH/DIAG IV INF INIT: CPT

## 2022-10-17 PROCEDURE — 700105 HCHG RX REV CODE 258: Performed by: STUDENT IN AN ORGANIZED HEALTH CARE EDUCATION/TRAINING PROGRAM

## 2022-10-17 PROCEDURE — 96366 THER/PROPH/DIAG IV INF ADDON: CPT

## 2022-10-17 PROCEDURE — 84484 ASSAY OF TROPONIN QUANT: CPT

## 2022-10-17 PROCEDURE — 700111 HCHG RX REV CODE 636 W/ 250 OVERRIDE (IP): Performed by: EMERGENCY MEDICINE

## 2022-10-17 PROCEDURE — 306637 HCHG MISC ORTHO ITEM RC 0274

## 2022-10-17 PROCEDURE — 83690 ASSAY OF LIPASE: CPT

## 2022-10-17 PROCEDURE — 71045 X-RAY EXAM CHEST 1 VIEW: CPT

## 2022-10-17 PROCEDURE — C9113 INJ PANTOPRAZOLE SODIUM, VIA: HCPCS | Performed by: EMERGENCY MEDICINE

## 2022-10-17 PROCEDURE — 85730 THROMBOPLASTIN TIME PARTIAL: CPT

## 2022-10-17 PROCEDURE — 86850 RBC ANTIBODY SCREEN: CPT

## 2022-10-17 PROCEDURE — 93005 ELECTROCARDIOGRAM TRACING: CPT | Performed by: EMERGENCY MEDICINE

## 2022-10-17 PROCEDURE — 86900 BLOOD TYPING SEROLOGIC ABO: CPT

## 2022-10-17 PROCEDURE — 36415 COLL VENOUS BLD VENIPUNCTURE: CPT

## 2022-10-17 PROCEDURE — 700101 HCHG RX REV CODE 250: Performed by: STUDENT IN AN ORGANIZED HEALTH CARE EDUCATION/TRAINING PROGRAM

## 2022-10-17 PROCEDURE — 96368 THER/DIAG CONCURRENT INF: CPT

## 2022-10-17 PROCEDURE — 85384 FIBRINOGEN ACTIVITY: CPT

## 2022-10-17 RX ORDER — FOLIC ACID 1 MG/1
1 TABLET ORAL DAILY
Status: DISCONTINUED | OUTPATIENT
Start: 2022-10-18 | End: 2022-10-18 | Stop reason: HOSPADM

## 2022-10-17 RX ORDER — SODIUM CHLORIDE, SODIUM LACTATE, POTASSIUM CHLORIDE, CALCIUM CHLORIDE 600; 310; 30; 20 MG/100ML; MG/100ML; MG/100ML; MG/100ML
INJECTION, SOLUTION INTRAVENOUS CONTINUOUS
Status: ACTIVE | OUTPATIENT
Start: 2022-10-17 | End: 2022-10-18

## 2022-10-17 RX ORDER — HYDROCODONE BITARTRATE AND ACETAMINOPHEN 5; 325 MG/1; MG/1
1-2 TABLET ORAL EVERY 6 HOURS PRN
Status: DISCONTINUED | OUTPATIENT
Start: 2022-10-17 | End: 2022-10-18 | Stop reason: HOSPADM

## 2022-10-17 RX ORDER — OCTREOTIDE ACETATE 100 UG/ML
50 INJECTION, SOLUTION INTRAVENOUS; SUBCUTANEOUS ONCE
Status: COMPLETED | OUTPATIENT
Start: 2022-10-17 | End: 2022-10-17

## 2022-10-17 RX ORDER — ONDANSETRON 2 MG/ML
4 INJECTION INTRAMUSCULAR; INTRAVENOUS EVERY 4 HOURS PRN
Status: DISCONTINUED | OUTPATIENT
Start: 2022-10-17 | End: 2022-10-18 | Stop reason: HOSPADM

## 2022-10-17 RX ORDER — GAUZE BANDAGE 2" X 2"
100 BANDAGE TOPICAL DAILY
Status: DISCONTINUED | OUTPATIENT
Start: 2022-10-18 | End: 2022-10-18 | Stop reason: HOSPADM

## 2022-10-17 RX ORDER — MORPHINE SULFATE 4 MG/ML
4 INJECTION INTRAVENOUS EVERY 4 HOURS PRN
Status: DISCONTINUED | OUTPATIENT
Start: 2022-10-17 | End: 2022-10-18 | Stop reason: HOSPADM

## 2022-10-17 RX ORDER — ONDANSETRON 4 MG/1
4 TABLET, ORALLY DISINTEGRATING ORAL EVERY 4 HOURS PRN
Status: DISCONTINUED | OUTPATIENT
Start: 2022-10-17 | End: 2022-10-18 | Stop reason: HOSPADM

## 2022-10-17 RX ORDER — ACETAMINOPHEN 325 MG/1
650 TABLET ORAL EVERY 6 HOURS PRN
Status: DISCONTINUED | OUTPATIENT
Start: 2022-10-17 | End: 2022-10-18 | Stop reason: HOSPADM

## 2022-10-17 RX ORDER — PANTOPRAZOLE SODIUM 40 MG/10ML
40 INJECTION, POWDER, LYOPHILIZED, FOR SOLUTION INTRAVENOUS 2 TIMES DAILY
Status: DISCONTINUED | OUTPATIENT
Start: 2022-10-18 | End: 2022-10-18 | Stop reason: HOSPADM

## 2022-10-17 RX ADMIN — CEFTRIAXONE SODIUM 1 G: 10 INJECTION, POWDER, FOR SOLUTION INTRAVENOUS at 20:06

## 2022-10-17 RX ADMIN — OCTREOTIDE ACETATE 50 MCG/HR: 200 INJECTION, SOLUTION INTRAVENOUS; SUBCUTANEOUS at 20:18

## 2022-10-17 RX ADMIN — PHYTONADIONE 10 MG: 10 INJECTION, EMULSION INTRAMUSCULAR; INTRAVENOUS; SUBCUTANEOUS at 20:08

## 2022-10-17 RX ADMIN — THIAMINE HYDROCHLORIDE: 100 INJECTION, SOLUTION INTRAMUSCULAR; INTRAVENOUS at 18:49

## 2022-10-17 RX ADMIN — SODIUM CHLORIDE, POTASSIUM CHLORIDE, SODIUM LACTATE AND CALCIUM CHLORIDE: 600; 310; 30; 20 INJECTION, SOLUTION INTRAVENOUS at 18:44

## 2022-10-17 RX ADMIN — OCTREOTIDE ACETATE 50 MCG: 100 INJECTION, SOLUTION INTRAVENOUS; SUBCUTANEOUS at 20:03

## 2022-10-17 RX ADMIN — PANTOPRAZOLE SODIUM 80 MG: 40 INJECTION, POWDER, LYOPHILIZED, FOR SOLUTION INTRAVENOUS at 18:42

## 2022-10-17 ASSESSMENT — ENCOUNTER SYMPTOMS
NERVOUS/ANXIOUS: 0
DOUBLE VISION: 0
VOMITING: 0
NECK PAIN: 0
SPUTUM PRODUCTION: 0
SPEECH CHANGE: 0
FALLS: 0
SHORTNESS OF BREATH: 0
ABDOMINAL PAIN: 1
FEVER: 0
HEADACHES: 1
HALLUCINATIONS: 0
SINUS PAIN: 0
CHILLS: 0
PALPITATIONS: 0
FOCAL WEAKNESS: 0
SENSORY CHANGE: 0
NAUSEA: 0
PHOTOPHOBIA: 0
EYE PAIN: 0
BLURRED VISION: 0
COUGH: 0

## 2022-10-17 NOTE — ED TRIAGE NOTES
"Chief Complaint   Patient presents with    Bloody Stools     Started 10/15 pt has had several episodes of dark black stool.     Headache     X2 weeks. Pt has been taking aspirin 4-5 tablets a day (unsure of dosage).      Pt ambulatory to triage for above complaint. Pt educated on triage process and informed to alert staff of any changes.    /75   Pulse 93   Temp 36.8 °C (98.2 °F) (Temporal)   Resp 16   Ht 1.88 m (6' 2\")   Wt 62 kg (136 lb 11 oz)   SpO2 99%   BMI 17.55 kg/m²     "

## 2022-10-17 NOTE — ED NOTES
Pt ambulated with a steady gait to room 23, pt placed in gown and monitor applied, warm blanket given

## 2022-10-17 NOTE — ED NOTES
Pt complains of headache over the last couple of weeks, pt also complains of dark stools, pt complains of abdominal pain and chest pain, denies shortness of breath, AOx4

## 2022-10-18 VITALS
TEMPERATURE: 98.6 F | HEART RATE: 104 BPM | HEIGHT: 74 IN | OXYGEN SATURATION: 94 % | SYSTOLIC BLOOD PRESSURE: 144 MMHG | DIASTOLIC BLOOD PRESSURE: 79 MMHG | RESPIRATION RATE: 19 BRPM | BODY MASS INDEX: 17.54 KG/M2 | WEIGHT: 136.69 LBS

## 2022-10-18 LAB
HGB BLD-MCNC: 10.8 G/DL (ref 14–18)
MAGNESIUM SERPL-MCNC: 2.2 MG/DL (ref 1.5–2.5)
PHOSPHATE SERPL-MCNC: 3.2 MG/DL (ref 2.5–4.5)

## 2022-10-18 PROCEDURE — A9270 NON-COVERED ITEM OR SERVICE: HCPCS | Performed by: STUDENT IN AN ORGANIZED HEALTH CARE EDUCATION/TRAINING PROGRAM

## 2022-10-18 PROCEDURE — 84100 ASSAY OF PHOSPHORUS: CPT

## 2022-10-18 PROCEDURE — 85018 HEMOGLOBIN: CPT

## 2022-10-18 PROCEDURE — 96366 THER/PROPH/DIAG IV INF ADDON: CPT

## 2022-10-18 PROCEDURE — 83735 ASSAY OF MAGNESIUM: CPT

## 2022-10-18 PROCEDURE — 700102 HCHG RX REV CODE 250 W/ 637 OVERRIDE(OP): Performed by: STUDENT IN AN ORGANIZED HEALTH CARE EDUCATION/TRAINING PROGRAM

## 2022-10-18 PROCEDURE — C9113 INJ PANTOPRAZOLE SODIUM, VIA: HCPCS | Performed by: STUDENT IN AN ORGANIZED HEALTH CARE EDUCATION/TRAINING PROGRAM

## 2022-10-18 PROCEDURE — 700111 HCHG RX REV CODE 636 W/ 250 OVERRIDE (IP): Performed by: STUDENT IN AN ORGANIZED HEALTH CARE EDUCATION/TRAINING PROGRAM

## 2022-10-18 PROCEDURE — 99239 HOSP IP/OBS DSCHRG MGMT >30: CPT | Performed by: INTERNAL MEDICINE

## 2022-10-18 RX ORDER — FOLIC ACID 1 MG/1
1 TABLET ORAL DAILY
Qty: 30 TABLET | Refills: 0 | Status: SHIPPED | OUTPATIENT
Start: 2022-10-18

## 2022-10-18 RX ORDER — LANOLIN ALCOHOL/MO/W.PET/CERES
100 CREAM (GRAM) TOPICAL DAILY
COMMUNITY
Start: 2022-10-18

## 2022-10-18 RX ADMIN — PANTOPRAZOLE SODIUM 40 MG: 40 INJECTION, POWDER, LYOPHILIZED, FOR SOLUTION INTRAVENOUS at 05:51

## 2022-10-18 RX ADMIN — ACETAMINOPHEN 650 MG: 325 TABLET, FILM COATED ORAL at 14:52

## 2022-10-18 RX ADMIN — ACETAMINOPHEN 650 MG: 325 TABLET, FILM COATED ORAL at 09:17

## 2022-10-18 NOTE — H&P
Hospital Medicine History & Physical Note    Date of Service  10/17/2022    Primary Care Physician  Pcp Pt States None    Consultants  None    Code Status  Full Code    Chief Complaint  Chief Complaint   Patient presents with    Bloody Stools     Started 10/15 pt has had several episodes of dark black stool.     Headache     X2 weeks. Pt has been taking aspirin 4-5 tablets a day (unsure of dosage).        History of Presenting Illness  Mame Lester is a 72 y.o. male history of urinary retention secondary to BPH, daily alcohol use (half to 1 bottle of wine per day) who presented 10/17/2022 with 2 days of melanotic stool. Patient reports couple weeks ago he started feeling myalgias and a headache similar to when he had COVID in the past, thinks he may have had another infection.  Overall he is significantly improved in regards to his myalgias and headaches, he was taking significant amount of aspirin/Excedrin to help with his pains.  He reports 2 days prior to presentation he had a small bottle of wine equivalent to approximately 2 glasses in the morning and then another 1 in the evening, shortly after that he started having lower abdominal discomfort mild in severity nonradiating associated with 1 episode of bloody emesis followed by melanotic stool since.  Lower abdominal discomfort has been waxing and waning but overall is improved.  On chart review he has been prescribed Flomax in the past for possible episodes of urinary retention, he reports no urinary symptoms at this time and reports his lower abdominal pain was different from when he had pain associated with urinary retention.  Due to the continued melanotic stool he presented for evaluation.  He denies any recent fevers or chills, vision changes, cough, dyspnea, nausea, dysuria or diarrhea.    In the ED patient is afebrile, pulse 93 normal respiratory rate and room air oxygen saturation blood pressure 110/75.  Initial work-up shows hemoglobin 11.6  down from 14.6 two months ago, normal platelets and no leukocytosis, CMP unremarkable troponin T 13 INR 1.23 chest x-ray negative for acute cardiopulmonary processes, EKG sinus rhythm no ST T-segment elevation or depression.  Protonix was ordered patient subsequently referred to hospitalist for admission.    I discussed the plan of care with patient, bedside RN, and pharmacy.    Review of Systems  Review of Systems   Constitutional:  Negative for chills and fever.   HENT:  Negative for congestion and sinus pain.    Eyes:  Negative for blurred vision, double vision, photophobia and pain.   Respiratory:  Negative for cough, sputum production and shortness of breath.    Cardiovascular:  Positive for chest pain. Negative for palpitations and leg swelling.   Gastrointestinal:  Positive for abdominal pain and melena. Negative for nausea and vomiting.   Genitourinary:  Negative for dysuria and urgency.   Musculoskeletal:  Negative for falls and neck pain.   Neurological:  Positive for headaches. Negative for sensory change, speech change and focal weakness.   Psychiatric/Behavioral:  Negative for hallucinations. The patient is not nervous/anxious.      Past Medical History   has no past medical history on file.    Surgical History   has no past surgical history on file.     Family History  family history is not on file.   Family history reviewed with patient. There is no family history that is pertinent to the chief complaint.     Social History   reports that he has never smoked. He has never used smokeless tobacco. He reports current alcohol use. He reports that he does not use drugs.    Allergies  No Known Allergies    Medications  None       Physical Exam  Temp:  [36.8 °C (98.2 °F)] 36.8 °C (98.2 °F)  Pulse:  [93] 93  Resp:  [16] 16  BP: (110)/(75) 110/75  SpO2:  [99 %] 99 %  Blood Pressure : 110/75   Temperature: 36.8 °C (98.2 °F)   Pulse: 93   Respiration: 16   Pulse Oximetry: 99 %       Physical Exam  Vitals and  nursing note reviewed. Exam conducted with a chaperone present.   Constitutional:       General: He is not in acute distress.     Appearance: He is not toxic-appearing.      Comments: Very thin 72-year-old male appears younger than stated age alert and conversant able to speak full sentences no acute distress nontoxic-appearing pleasant mood   HENT:      Head: Normocephalic and atraumatic.      Nose: Nose normal. No rhinorrhea.      Mouth/Throat:      Mouth: Mucous membranes are moist.      Pharynx: Oropharynx is clear.   Eyes:      General: No scleral icterus.     Extraocular Movements: Extraocular movements intact.      Conjunctiva/sclera: Conjunctivae normal.      Pupils: Pupils are equal, round, and reactive to light.   Cardiovascular:      Rate and Rhythm: Normal rate and regular rhythm.      Pulses: Normal pulses.      Heart sounds: No murmur heard.  Pulmonary:      Effort: Pulmonary effort is normal. No respiratory distress.      Breath sounds: Normal breath sounds. No wheezing, rhonchi or rales.   Abdominal:      Palpations: Abdomen is soft.      Tenderness: There is abdominal tenderness (mild lower abdominal TTP). There is no guarding or rebound.   Musculoskeletal:         General: Normal range of motion.      Cervical back: Normal range of motion and neck supple. No rigidity or tenderness.      Right lower leg: No edema.      Left lower leg: No edema.      Comments: Global muscle/fat wasting   Skin:     General: Skin is warm and dry.      Capillary Refill: Capillary refill takes less than 2 seconds.      Coloration: Skin is pale.   Neurological:      General: No focal deficit present.      Mental Status: He is alert and oriented to person, place, and time. Mental status is at baseline.      Cranial Nerves: No cranial nerve deficit.      Sensory: No sensory deficit.      Motor: No weakness.      Coordination: Coordination normal.   Psychiatric:         Mood and Affect: Mood normal.         Behavior:  "Behavior normal.         Thought Content: Thought content normal.         Judgment: Judgment normal.       Laboratory:  Recent Labs     10/17/22  1625   WBC 6.9   RBC 3.74*   HEMOGLOBIN 11.6*   HEMATOCRIT 35.1*   MCV 93.9   MCH 31.0   MCHC 33.0*   RDW 45.1   PLATELETCT 344   MPV 9.6     Recent Labs     10/17/22  1625   SODIUM 139   POTASSIUM 3.8   CHLORIDE 100   CO2 26   GLUCOSE 88   BUN 18   CREATININE 0.72   CALCIUM 8.8     Recent Labs     10/17/22  1625   ALTSGPT 10   ASTSGOT 20   ALKPHOSPHAT 80   TBILIRUBIN 0.3   LIPASE 27   GLUCOSE 88     Recent Labs     10/17/22  1625   APTT 30.3   INR 1.23*     No results for input(s): NTPROBNP in the last 72 hours.      Recent Labs     10/17/22  1625   TROPONINT 13       Imaging:  DX-CHEST-PORTABLE (1 VIEW)   Final Result      No acute cardiopulmonary disease evident.          X-Ray:  My impression is: chest x-ray negative for acute cardiopulmonary processes  EKG:  My impression is: EKG sinus rhythm no ST T-segment elevation or depression    Assessment/Plan:  Justification for Admission Status  I anticipate this patient will require at least two midnights for appropriate medical management, necessitating inpatient admission because GI Bleed      * GI bleed- (present on admission)  Assessment & Plan  Several days of melena with daily aspirin and ETOH use, suspecting gastritis/gastric ulcer.  Serial monitor hg/hct and transfuse for hg < 7.  Correct and monitor coagulopathy.  IV Protonix.  Given EtOH history and concern he is minimizing his habit I will cover with octreotide and ceftriaxone for now.  GI consult.  NPO    Protein calorie malnutrition (HCC)- (present on admission)  Assessment & Plan  Admission BMI 17.55  Nutrition consult, supplements ordered for when he's not NPO.  Reports he has always been thin but has been gradually losing weight for \"years\".  Denies any night sweats.    Acute blood loss anemia (ABLA)- (present on admission)  Assessment & Plan  Hgb 11.6 on " admission down from 14.6 on 8/13/22 with several days melenotic stool.  Monitor and transfuse for hgb <7, please see GI bleed problem for more details.    ETOH abuse- (present on admission)  Assessment & Plan  I suspect he may be minimizing his drinking habit, he comments that he drinks small bottles of wine equivalent to about 2 glasses and he has 1-2 a day, sometimes more, sometimes not every day.  Denies any history of withdrawal.    His habit of drinking in the morning that he endorsed to me raises concern for possibly minimizing larger EtOH habit, will need to monitor closely for signs of withdrawal.  He reports 1/2 to 1 bottle of wine daily.  MVI, folate, thiamine  Check and replace magnesium, potassium, phosphorus        VTE prophylaxis: SCDs/TEDs

## 2022-10-18 NOTE — PROGRESS NOTES
Discharge orders placed. Patient discharged with a copy of discharge instructions - all questions answered. Patient left in no distress.

## 2022-10-18 NOTE — DISCHARGE INSTRUCTIONS
Discharge Instructions    Discharged to home by car with self. Discharged via walking, hospital escort: Yes.  Special equipment needed: Not Applicable    Be sure to schedule a follow-up appointment with your primary care doctor or any specialists as instructed.     Discharge Plan:        I understand that a diet low in cholesterol, fat, and sodium is recommended for good health. Unless I have been given specific instructions below for another diet, I accept this instruction as my diet prescription.   Other diet: Regular    Special Instructions: None    -Is this patient being discharged with medication to prevent blood clots?  No    Is patient discharged on Warfarin / Coumadin?   No

## 2022-10-18 NOTE — DISCHARGE SUMMARY
Discharge Summary    CHIEF COMPLAINT ON ADMISSION  Chief Complaint   Patient presents with    Bloody Stools     Started 10/15 pt has had several episodes of dark black stool.     Headache     X2 weeks. Pt has been taking aspirin 4-5 tablets a day (unsure of dosage).        Reason for Admission  GI Bleed     Admission Date  10/17/2022    CODE STATUS  Full Code    HPI & HOSPITAL COURSE  This is a 72 y.o. male here with Bloody Stools (Started 10/15 pt has had several episodes of dark black stool. ) and Headache (X2 weeks. Pt has been taking aspirin 4-5 tablets a day (unsure of dosage). )  Please review Dr. Wade Thurston M.D. notes for further details of history of present illness, past medical/social/family histories, allergies and medications. Please review Dr. Benoit, GI consultation notes.  He is a current alcohol drinker but is not reliable with that history. He presents with multiple days of melena and tarry stools. He also has nausea and vomited once. His belly pain disappeared after viomiting. No hematochezia, belly pain, BRBPR.   At the ED, he is afebrile, hemodynamically stable.   No leukocytosis. Hb 11.6. Hepatic function panel within normal limits. Normal lipase. Electrolytes WNL.  He was put on detox protocol. He had no further bleeding. I consulted GI however ultimately patient decided AGAINST colonoscopy or endoscopy. He delined constinuing detox and wanted to go home. He was discharged with follow up. He was advised NO ALCOHOL. He declined [facility].    He is thin and supplements were ordered. He can obtain that outpatient.    At discharge date, Mame Lester afebrile and hemodynamically stable.  Mame Lester wanted to be discharged today.    Discharge Physical Exam  General/Constitutional: No acute distress. Thin  Head: Normocephalic, atraumatic  ENT: Oral mucosa is moist. No obvious pharyngeal exudates  Eyes: Pink conjunctiva, no scleral icterus  Neck: Supple, no  lymphadenopathy  Cardiovascular: Normal rate and regular rhythm. S1,2 noted. No murmurs, gallops or rubs.  Pulmonary: Clear to auscultation bilaterally. No wheezes, rales or rhonchi.  Abdominal: Soft, nontender, not distended, bowel sounds normoactive. No guarding or peritoneal signs.  Musculoskeletal: No tenderness to palpation of chest wall.  Neurologic: Alert and oriented. Grossly nonfocal, moving all extremities.  Genitourinary: No gross hematuria  Skin: No obvious rash.  Psychiatric: Pleasant, cooperative.  Vitals Reviewed  Labs Reviewed  Imaging reviewed  Nursing notes reviewed      Imaging  DX-CHEST-PORTABLE (1 VIEW)   Final Result      No acute cardiopulmonary disease evident.                Therefore, he is discharged in fair and stable condition to home with close outpatient follow-up.    The patient recovered much more quickly than anticipated on admission.    Discharge Date  10/18/2022    FOLLOW UP ITEMS POST DISCHARGE      DISCHARGE DIAGNOSES  Principal Problem:    GI bleed, EtOH/malnutrition POA: Yes  Active Problems:    ETOH abuse POA: Yes    Acute blood loss anemia (ABLA) POA: Yes    Protein calorie malnutrition (HCC) POA: Yes  Resolved Problems:    * No resolved hospital problems. *      FOLLOW UP  No future appointments.  No follow-up provider specified.  Assign and follow up with primary provider or discharge clinic physician in 1 week,  primary provider can work up malnutrition with outpatient CT r/o malignancy and please take supplements aside from healthy calorific diet. Follow up with Dr. Benoit, GI in 1 week for melena Follow up with urologist for urinary retention in 1-2 weeks NURSING provide resources for alcohol cessation (no more alcohol) and malnutrition (eat supplements and follow up with PCP)  MEDICATIONS ON DISCHARGE     Medication List        START taking these medications        Instructions   folic acid 1 MG Tabs  Commonly known as: FOLVITE   Take 1 Tablet by mouth every  day.  Dose: 1 mg     multivitamin Tabs   Take 1 Tablet by mouth every day.  Dose: 1 Tablet     thiamine 100 MG tablet  Commonly known as: THIAMINE   Take 1 Tablet by mouth every day.  Dose: 100 mg            STOP taking these medications      asa/apap/caffeine 250-250-65 MG Tabs  Commonly known as: EXCEDRIN              Allergies  No Known Allergies    DIET  Orders Placed This Encounter   Procedures    Diet NPO Restrict to: Sips with Medications     Standing Status:   Standing     Number of Occurrences:   1     Order Specific Question:   Diet NPO Restrict to:     Answer:   Sips with Medications [3]       ACTIVITY  Avoid heavy lifting or strenuous activity      CONSULTATIONS  GI    PROCEDURES  DX-CHEST-PORTABLE (1 VIEW)    Result Date: 10/17/2022  10/17/2022 5:00 PM HISTORY/REASON FOR EXAM:  Blood in vomit or stool or dark tarry stool. TECHNIQUE/EXAM DESCRIPTION AND NUMBER OF VIEWS: Single portable view of the chest. Total 2 films. COMPARISON: None FINDINGS: The soft tissues and bony structures are unremarkable except for slight upper thoracic curvature convex right. The heart and mediastinal structures are within normal limits. Pulmonary vascularity is normal. The lung fields are clear. There is no effusion or pneumothorax.     No acute cardiopulmonary disease evident.       LABORATORY  Lab Results   Component Value Date    SODIUM 139 10/17/2022    POTASSIUM 3.8 10/17/2022    CHLORIDE 100 10/17/2022    CO2 26 10/17/2022    GLUCOSE 88 10/17/2022    BUN 18 10/17/2022    CREATININE 0.72 10/17/2022        Lab Results   Component Value Date    WBC 6.9 10/17/2022    HEMOGLOBIN 10.8 (L) 10/18/2022    HEMATOCRIT 35.1 (L) 10/17/2022    PLATELETCT 344 10/17/2022        Total time of the discharge process exceeds 35 minutes.

## 2022-10-18 NOTE — ASSESSMENT & PLAN NOTE
I suspect he may be minimizing his drinking habit, he comments that he drinks small bottles of wine equivalent to about 2 glasses and he has 1-2 a day, sometimes more, sometimes not every day.  Denies any history of withdrawal.    His habit of drinking in the morning that he endorsed to me raises concern for possibly minimizing larger EtOH habit, will need to monitor closely for signs of withdrawal.  He reports 1/2 to 1 bottle of wine daily.  MVI, folate, thiamine  Check and replace magnesium, potassium, phosphorus

## 2022-10-18 NOTE — ASSESSMENT & PLAN NOTE
Several days of melena with daily aspirin and ETOH use, suspecting gastritis/gastric ulcer.  Serial monitor hg/hct and transfuse for hg < 7.  Correct and monitor coagulopathy.  IV Protonix.  Given EtOH history and concern he is minimizing his habit I will cover with octreotide and ceftriaxone for now.  GI consult.  NPO

## 2022-10-18 NOTE — PROGRESS NOTES
Patient transferred from green to yellow pod. Patient is A&O x4 and on room air. No c/o pain. No s/s of bleeding. Patient is resting in bed with call light within reach.

## 2022-10-18 NOTE — ASSESSMENT & PLAN NOTE
"Admission BMI 17.55  Nutrition consult, supplements ordered for when he's not NPO.  Reports he has always been thin but has been gradually losing weight for \"years\".  Denies any night sweats.  "

## 2022-10-18 NOTE — ED PROVIDER NOTES
ED Provider Note    Scribed for Herbert Paul M.D. by Vita Mahajan. 10/17/2022  5:16 PM    Primary care provider: Pcp Pt States None  Means of arrival: Walk in  History obtained from: Patient  History limited by: None    CHIEF COMPLAINT  Chief Complaint   Patient presents with    Bloody Stools     Started 10/15 pt has had several episodes of dark black stool.     Headache     X2 weeks. Pt has been taking aspirin 4-5 tablets a day (unsure of dosage).        MATTHEW Lester is a 72 y.o. male who presents to the Emergency Department for evaluation of bloody stool onset two days ago. Patient has had several episodes of dark black stool. He admits to associated symptoms of lower abdominal pain, chest pain, headache, but denies fever. Patient has multiple episodes of black stools daily. No alleviating factors were reported. Patient has been taking Excedrin. Patient is not on any blood thinners other than aspirin. Patient drinks several glasses of red wine daily.       REVIEW OF SYSTEMS  Pertinent positives include: bloody stools, abdominal pain, chest pain, and headache. Pertinent negatives include: fever. See history of present illness. All other systems are negative.     PAST MEDICAL HISTORY       SURGICAL HISTORY  patient denies any surgical history    SOCIAL HISTORY  Social History     Tobacco Use    Smoking status: Never    Smokeless tobacco: Never   Vaping Use    Vaping Use: Never used   Substance Use Topics    Alcohol use: Yes     Comment: occ    Drug use: No      Social History     Substance and Sexual Activity   Drug Use No       FAMILY HISTORY  History reviewed. No pertinent family history.    CURRENT MEDICATIONS  Home Medications       Reviewed by Chapito Roland (Pharmacy Tech) on 10/17/22 at 1857  Med List Status: Complete     Medication Last Dose Status   asa/apap/caffeine (EXCEDRIN) 250-250-65 MG Tab 10/17/2022 Active                    ALLERGIES  No Known Allergies    PHYSICAL  "EXAM  VITAL SIGNS: /75   Pulse 93   Temp 36.8 °C (98.2 °F) (Temporal)   Resp 16   Ht 1.88 m (6' 2\")   Wt 62 kg (136 lb 11 oz)   SpO2 99%   BMI 17.55 kg/m²     Constitutional: Alert in no apparent distress.  HENT: No signs of trauma, Bilateral external ears normal, Nose normal. Uvula midline.   Eyes: Pupils are equal and reactive, Conjunctiva normal, Non-icteric.   Neck: Normal range of motion, No tenderness, Supple, No stridor.   Lymphatic: No lymphadenopathy noted.   Cardiovascular: Regular rate and rhythm, no murmurs.   Thorax & Lungs: Normal breath sounds, No respiratory distress, No wheezing, No chest tenderness.   Abdomen:  Mild diffuse abdominal pain, Soft, No peritoneal signs, No masses, No pulsatile masses.   Skin: Warm, Dry, No erythema, No rash.   Back: No bony tenderness, No CVA tenderness.   Extremities: Intact distal pulses, No edema, No tenderness, No cyanosis.  Musculoskeletal: Good range of motion in all major joints. No tenderness to palpation or major deformities noted.   Neurologic: Alert , Normal motor function, Normal sensory function, No focal deficits noted.   Psychiatric: Affect normal, Judgment normal, Mood normal.     DIAGNOSTIC STUDIES / PROCEDURES    LABS  Labs Reviewed   CBC WITH DIFFERENTIAL - Abnormal; Notable for the following components:       Result Value    RBC 3.74 (*)     Hemoglobin 11.6 (*)     Hematocrit 35.1 (*)     MCHC 33.0 (*)     Lymphocytes 20.90 (*)     All other components within normal limits    Narrative:     Indicate which anticoagulants the patient is on:->UNKNOWN   PROTHROMBIN TIME - Abnormal; Notable for the following components:    PT 15.3 (*)     INR 1.23 (*)     All other components within normal limits    Narrative:     Indicate which anticoagulants the patient is on:->UNKNOWN   HGB - Abnormal; Notable for the following components:    Hemoglobin 10.7 (*)     All other components within normal limits   BASIC TEG - Abnormal; Notable for the " following components:    Clot Angle-Angle 79.7 (*)     TEG Functional Fibrinogen(MA) 36.0 (*)     All other components within normal limits    Narrative:     Is the patient on heparin (including low molecular weight  heparin, subcutaneous heparin, or lovenox)?->No  Do you want to extend TEG graph to LY30? (If no, graph will  terminate at MA)->Yes   COD (ADULT)   COMP METABOLIC PANEL    Narrative:     Indicate which anticoagulants the patient is on:->UNKNOWN   LIPASE    Narrative:     Indicate which anticoagulants the patient is on:->UNKNOWN   APTT    Narrative:     Indicate which anticoagulants the patient is on:->UNKNOWN   ABO RH CONFIRM   ESTIMATED GFR    Narrative:     Indicate which anticoagulants the patient is on:->UNKNOWN   TROPONIN    Narrative:     Biotin intake of greater than 5 mg per day may interfere with  troponin levels, causing false low values.   MAGNESIUM   PHOSPHORUS      All labs reviewed by me.    EKG      RADIOLOGY  DX-CHEST-PORTABLE (1 VIEW)   Final Result      No acute cardiopulmonary disease evident.        The radiologist's interpretation of all radiological studies have been reviewed by me.    COURSE & MEDICAL DECISION MAKING  Nursing notes, VS, PMSFHx reviewed in chart.    72 y.o. male p/w chief complaint of bloody stool.    5:16 PM Patient seen and examined at bedside.  Patient has a hemoglobin of 11.6. Patient will be treated with protonix.     I verified that the patient was wearing a mask and I was wearing appropriate PPE every time I entered the room. The patient's mask was on the patient at all times during my encounter except for a brief view of the oropharynx.     The differential diagnoses include but are not limited to:   #GI bleed    1x large bore IVs placed. Type & screen sent.  Patient reports black and tarry stools for the last 3 days  Protonix ordered by me  Patient wishes to defer digital rectal exam at this time however given description of black and tarry schools with  recent blood loss anemia and concern for upper GI bleed  Patient not anticoagulated  Heart score 2, EKG with no ST elevation and negative troponin    5:59 PM Paged Hospitalist.    6:08 PM I discussed the patient's case and the above findings with Dr. Thurston (Hospitalist) who agrees to evaluate patient.    DISPOSITION:  Patient will be hospitalized by Dr. Thurston (Hospitalist) in guarded condition.      FINAL IMPRESSION  1. Gastrointestinal hemorrhage, unspecified gastrointestinal hemorrhage type    2. Acute blood loss anemia    3. Acute chest pain          Vita HUNT (Daniela), am scribing for, and in the presence of, Herbert Paul M.D..    Electronically signed by: Vita Mahajan (Daniela), 10/17/2022    IHerbert M.D. personally performed the services described in this documentation, as scribed by Vita Mahajan in my presence, and it is both accurate and complete.    The note accurately reflects work and decisions made by me.  Herbert Paul M.D.  10/17/2022  9:26 PM

## 2022-10-18 NOTE — CONSULTS
DATE OF SERVICE:  10/18/2022     REASON FOR CONSULTATION:  GI bleed.     HISTORY OF PRESENT ILLNESS:  The patient is a 72-year-old male who comes in   with 2-3 days of melanotic stools.  The patient reports for headaches, he has   been taking a lot of Excedrin recently.  The patient does admit to alcohol use   and while he may be underreporting based on chart review, the patient reports   he will have 2 glasses of wine every other day on average.  The patient   denies any hematemesis or hematochezia.  He does report that he had some   epigastric discomfort that has resolved after vomiting.  The patient denies   fever, chills, shortness of breath.  The patient did have some chest   discomfort at the same time as his epigastric discomfort; however, again, that   has resolved.     PAST MEDICAL HISTORY:  Alcohol use versus abuse.     PAST SURGICAL HISTORY:  The patient denies any.     ALLERGIES:  No known drug allergies.     FAMILY HISTORY:  Noncontributory.     SOCIAL HISTORY: The patient denies tobacco use.  The patient admits to alcohol   use. Per the patient, he reports 2 glasses of wine every other day, so on   average 1 glass daily; However, per hospitalist note, the patient may be   underreporting as he had drank in the morning prior to coming into the ER.     REVIEW OF SYSTEMS:   A 14-point review of systems was obtained and found to be   negative except for those above in the HPI.     PHYSICAL EXAMINATION:    GENERAL:  No acute distress, alert, awake and oriented x3.  VITAL SIGNS:  Stable.  HEENT:  PERRLA.  Extraocular movements intact.  Sclerae are anicteric.  HEART:  Regular rate and rhythm.  LUNGS:  Clear to auscultation bilaterally.  ABDOMEN:  Soft and nontender.  No guarding or rebound.  MUSCULOSKELETAL:  No edema noted on bilateral lower extremities.  NEUROLOGIC:  Grossly intact.  PSYCHIATRIC:  Normal.  SKIN:  No jaundice or pallor.     RESULTS:    LABORATORY DATA:  White count 6.9, hemoglobin 10.8,  hematocrit 35.1, platelets   344.  AST 20, ALT 10, alkaline phosphatase 80, total bilirubin 0.3, albumin   3.7.  INR 1.23.     ASSESSMENT AND PLAN:  1.  Gastrointestinal bleed, suspect upper gastrointestinal source.  Recommend   EGD.  The patient tentatively scheduled for 8 a.m. and recommend clear liquids   today, n.p.o. after midnight; however, the patient refuses procedure and   would rather be discharged.  I explained that we may have missed pathology and   the patient risks further bleeding or missing something like a malignancy in   the esophagus or stomach, etc.  The patient reports he does not wish to   undergo any further diagnostic workup.  If the patient will now undergo EGD,   recommend PPI b.i.d. for 8 weeks to heal up any potential ulcerations.    Recommend avoiding NSAIDs and aspirin in the future, use Tylenol sparingly as   needed for headaches.  Clear liquid diet, advance as tolerated.  2.  Mild anemia could be secondary to acute GI bleeding, see above.  No need   for transfusion.  Hemodynamically stable currently.  Recommend outpatient   followup with [facility].  3.  Alcohol use, unclear whether the patient abuses alcohol or not.  The   patient had drank in the morning prior to coming in, which is one risk factor,   but the patient does not report heavy use and he does have normal liver   enzymes.  Either way, I recommend minimizing alcohol use.  4.  Epigastric pain.  Avoid aspirin and NSAIDs.  See above for PPI.     RECOMMENDATIONS:  Recommend follow up with our nurse practitioner at [facility].        ______________________________  DO JOSE Gu    DD:  10/18/2022 11:41  DT:  10/18/2022 13:17    Job#:  [id number]

## 2022-10-18 NOTE — ASSESSMENT & PLAN NOTE
Hgb 11.6 on admission down from 14.6 on 8/13/22 with several days melenotic stool.  Monitor and transfuse for hgb <7, please see GI bleed problem for more details.